# Patient Record
(demographics unavailable — no encounter records)

---

## 2025-03-10 NOTE — HISTORY OF PRESENT ILLNESS
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  Feels well without complaints today  While I was on leave- patient was asked to see Neuro for refills of ADHD meds Seen by Ellen Hoang, labs done

## 2025-03-10 NOTE — HEALTH RISK ASSESSMENT
[Yes] : Yes [Monthly or less (1 pt)] : Monthly or less (1 point) [1 or 2 (0 pts)] : 1 or 2 (0 points) [Never (0 pts)] : Never (0 points) [PHQ-2 Negative - No further assessment needed] : PHQ-2 Negative - No further assessment needed [None] : None [Employed] : employed [Graduate School] : graduate school [Significant Other] : lives with significant other [Sexually Active] : sexually active [No falls in past year] : Patient reported no falls in the past year [0] : 2) Feeling down, depressed, or hopeless: Not at all (0) [Never] : Never [] :  [de-identified] : SOCIAL [Audit-CScore] : 1 [de-identified] : walk [de-identified] : Cutting out sweets [[id number]] : 0 [High Risk Behavior] : no high risk behavior [Reports changes in hearing] : Reports no changes in hearing [Reports changes in vision] : Reports no changes in vision [Reports changes in dental health] : Reports no changes in dental health [FreeTextEntry2] : Teacher